# Patient Record
Sex: MALE | Race: NATIVE HAWAIIAN OR OTHER PACIFIC ISLANDER
[De-identification: names, ages, dates, MRNs, and addresses within clinical notes are randomized per-mention and may not be internally consistent; named-entity substitution may affect disease eponyms.]

---

## 2017-01-05 ENCOUNTER — HOSPITAL ENCOUNTER (OUTPATIENT)
Dept: HOSPITAL 67 - MRI | Age: 52
Discharge: HOME | End: 2017-01-05
Attending: NURSE PRACTITIONER
Payer: COMMERCIAL

## 2017-01-05 DIAGNOSIS — R10.32: Primary | ICD-10-CM

## 2017-01-27 ENCOUNTER — HOSPITAL ENCOUNTER (INPATIENT)
Dept: HOSPITAL 67 - ED | Age: 52
LOS: 4 days | Discharge: HOME | DRG: 155 | End: 2017-01-31
Attending: INTERNAL MEDICINE | Admitting: EMERGENCY MEDICINE
Payer: COMMERCIAL

## 2017-01-27 VITALS — DIASTOLIC BLOOD PRESSURE: 65 MMHG | SYSTOLIC BLOOD PRESSURE: 107 MMHG | TEMPERATURE: 98.2 F

## 2017-01-27 VITALS — SYSTOLIC BLOOD PRESSURE: 118 MMHG | DIASTOLIC BLOOD PRESSURE: 66 MMHG | TEMPERATURE: 97.8 F

## 2017-01-27 VITALS
HEIGHT: 76 IN | SYSTOLIC BLOOD PRESSURE: 147 MMHG | DIASTOLIC BLOOD PRESSURE: 82 MMHG | TEMPERATURE: 97.6 F | BODY MASS INDEX: 30.85 KG/M2 | WEIGHT: 253.37 LBS

## 2017-01-27 VITALS — DIASTOLIC BLOOD PRESSURE: 60 MMHG | SYSTOLIC BLOOD PRESSURE: 105 MMHG

## 2017-01-27 VITALS — DIASTOLIC BLOOD PRESSURE: 71 MMHG | TEMPERATURE: 98.2 F | SYSTOLIC BLOOD PRESSURE: 118 MMHG

## 2017-01-27 VITALS — DIASTOLIC BLOOD PRESSURE: 66 MMHG | SYSTOLIC BLOOD PRESSURE: 115 MMHG

## 2017-01-27 DIAGNOSIS — C91.10: ICD-10-CM

## 2017-01-27 DIAGNOSIS — J38.4: Primary | ICD-10-CM

## 2017-01-27 LAB
PLATELET # BLD: 189 K/UL (ref 142–355)
POTASSIUM SERPL-SCNC: 5.2 MMOL/L (ref 3.6–5.2)
SODIUM SERPL-SCNC: 135 MMOL/L (ref 136–145)

## 2017-01-27 PROCEDURE — 36415 COLL VENOUS BLD VENIPUNCTURE: CPT

## 2017-01-27 PROCEDURE — 96361 HYDRATE IV INFUSION ADD-ON: CPT

## 2017-01-27 PROCEDURE — 96367 TX/PROPH/DG ADDL SEQ IV INF: CPT

## 2017-01-27 PROCEDURE — 99284 EMERGENCY DEPT VISIT MOD MDM: CPT

## 2017-01-27 PROCEDURE — 80053 COMPREHEN METABOLIC PANEL: CPT

## 2017-01-27 PROCEDURE — 96365 THER/PROPH/DIAG IV INF INIT: CPT

## 2017-01-27 PROCEDURE — 85027 COMPLETE CBC AUTOMATED: CPT

## 2017-01-27 PROCEDURE — 96366 THER/PROPH/DIAG IV INF ADDON: CPT

## 2017-01-27 PROCEDURE — 85651 RBC SED RATE NONAUTOMATED: CPT

## 2017-01-27 PROCEDURE — 94760 N-INVAS EAR/PLS OXIMETRY 1: CPT

## 2017-01-27 PROCEDURE — 80048 BASIC METABOLIC PNL TOTAL CA: CPT

## 2017-01-27 PROCEDURE — 85007 BL SMEAR W/DIFF WBC COUNT: CPT

## 2017-01-27 PROCEDURE — 83735 ASSAY OF MAGNESIUM: CPT

## 2017-01-27 NOTE — NUR
PATIENT ADMITTED FROM ER TO ROOM 114. PATIENT IS ALERT AND ORIENTED ON
ARRIVAL, NO SIGNS OF DIFFICULTY BREATHING OR DISTRESS. ADMISSION ASSESSMENT
AND PHYSICAL OBTAINED FROM PATIENT. PATIENT ORIENTED TO BED AND CALL SYSTEM.
INSTRUCTED TO CALL FOR ASSISTANCE IF NEEDED. ADMISSION ORDERS NOTED AND
CARRIED OUT.

## 2017-01-27 NOTE — NUR
PATIENT HAS NO COMPLAINTS, DENIES PAIN. STATES HE IS SWALLOWING WITHOUT
DIFFICULTY. DISCONNECTED IV FOR HIM TO GET A SHOWER. FAMILY AT BEDSIDE.

## 2017-01-27 NOTE — NUR
1/27/17 4456 PT REQUESTING TO SEE IF HE COULD HAVE SOME OXYGEN TO USE.PT SAID
HE NOSE IS STOPPED UP AND CANNOT BREATHE OUT OF HIS NOSE.RESPIRATORY NOTIFIED
TO SET UP OXYGEN HUMFIED AT BEDSIDE.O2 PLACED ON PT PER RESPIRATORY AT 2
LPM.CC

## 2017-01-28 VITALS — SYSTOLIC BLOOD PRESSURE: 108 MMHG | TEMPERATURE: 98 F | DIASTOLIC BLOOD PRESSURE: 66 MMHG

## 2017-01-28 VITALS — SYSTOLIC BLOOD PRESSURE: 125 MMHG | DIASTOLIC BLOOD PRESSURE: 72 MMHG | TEMPERATURE: 98 F

## 2017-01-28 VITALS — DIASTOLIC BLOOD PRESSURE: 68 MMHG | SYSTOLIC BLOOD PRESSURE: 105 MMHG | TEMPERATURE: 97.6 F

## 2017-01-28 VITALS — DIASTOLIC BLOOD PRESSURE: 72 MMHG | TEMPERATURE: 98 F | SYSTOLIC BLOOD PRESSURE: 128 MMHG

## 2017-01-28 VITALS — TEMPERATURE: 98.1 F | DIASTOLIC BLOOD PRESSURE: 67 MMHG | SYSTOLIC BLOOD PRESSURE: 124 MMHG

## 2017-01-28 LAB
PLATELET # BLD: 207 K/UL (ref 142–355)
POTASSIUM SERPL-SCNC: 5.2 MMOL/L (ref 3.6–5.2)
SODIUM SERPL-SCNC: 132 MMOL/L (ref 136–145)

## 2017-01-28 NOTE — NUR
1/28/17 2000 A/O SITTING HIGH FOWLERS NO C/O VOICED.PT STATES HE IS NOT HAVING
ANY DIFFICULTY  SWALLOWING.PT SAID HE HAS HAD A GOOD DAY.PT SAID THAT THE
OXYGEN HELPS IF HE GETS STOPPED UP THAT IT SEEMS LIKE IT WORKS WELL.CC

## 2017-01-29 VITALS — TEMPERATURE: 98 F | DIASTOLIC BLOOD PRESSURE: 62 MMHG | SYSTOLIC BLOOD PRESSURE: 143 MMHG

## 2017-01-29 VITALS — SYSTOLIC BLOOD PRESSURE: 126 MMHG | TEMPERATURE: 98.1 F | DIASTOLIC BLOOD PRESSURE: 61 MMHG

## 2017-01-29 VITALS — DIASTOLIC BLOOD PRESSURE: 50 MMHG | TEMPERATURE: 97.9 F | SYSTOLIC BLOOD PRESSURE: 115 MMHG

## 2017-01-29 VITALS — DIASTOLIC BLOOD PRESSURE: 67 MMHG | SYSTOLIC BLOOD PRESSURE: 120 MMHG | TEMPERATURE: 98.2 F

## 2017-01-29 VITALS — DIASTOLIC BLOOD PRESSURE: 62 MMHG | TEMPERATURE: 98.1 F | SYSTOLIC BLOOD PRESSURE: 112 MMHG

## 2017-01-29 LAB
PLATELET # BLD: 210 K/UL (ref 142–355)
POTASSIUM SERPL-SCNC: 4.7 MMOL/L (ref 3.6–5.2)
SODIUM SERPL-SCNC: 137 MMOL/L (ref 136–145)

## 2017-01-30 VITALS — SYSTOLIC BLOOD PRESSURE: 122 MMHG | DIASTOLIC BLOOD PRESSURE: 67 MMHG | TEMPERATURE: 97.9 F

## 2017-01-30 VITALS — DIASTOLIC BLOOD PRESSURE: 67 MMHG | SYSTOLIC BLOOD PRESSURE: 111 MMHG | TEMPERATURE: 98.1 F

## 2017-01-30 LAB
PLATELET # BLD: 197 K/UL (ref 142–355)
POTASSIUM SERPL-SCNC: 4.5 MMOL/L (ref 3.6–5.2)
SODIUM SERPL-SCNC: 137 MMOL/L (ref 136–145)

## 2017-01-31 LAB
PLATELET # BLD: 242 K/UL (ref 142–355)
POTASSIUM SERPL-SCNC: 4.5 MMOL/L (ref 3.6–5.2)
SODIUM SERPL-SCNC: 138 MMOL/L (ref 136–145)

## 2017-05-25 ENCOUNTER — HOSPITAL ENCOUNTER (EMERGENCY)
Dept: HOSPITAL 67 - ED | Age: 52
LOS: 1 days | Discharge: HOME | End: 2017-05-26
Payer: COMMERCIAL

## 2017-05-25 VITALS — HEIGHT: 76 IN | BODY MASS INDEX: 29.22 KG/M2 | WEIGHT: 240 LBS

## 2017-05-25 DIAGNOSIS — R59.1: Primary | ICD-10-CM

## 2017-05-25 DIAGNOSIS — C85.90: ICD-10-CM

## 2017-05-25 DIAGNOSIS — R13.10: ICD-10-CM

## 2017-05-25 PROCEDURE — 85027 COMPLETE CBC AUTOMATED: CPT

## 2017-05-25 PROCEDURE — 99284 EMERGENCY DEPT VISIT MOD MDM: CPT

## 2017-05-25 PROCEDURE — 96361 HYDRATE IV INFUSION ADD-ON: CPT

## 2017-05-25 PROCEDURE — 85007 BL SMEAR W/DIFF WBC COUNT: CPT

## 2017-05-25 PROCEDURE — 36415 COLL VENOUS BLD VENIPUNCTURE: CPT

## 2017-05-25 PROCEDURE — 83880 ASSAY OF NATRIURETIC PEPTIDE: CPT

## 2017-05-25 PROCEDURE — 96374 THER/PROPH/DIAG INJ IV PUSH: CPT

## 2017-05-25 PROCEDURE — 80053 COMPREHEN METABOLIC PANEL: CPT

## 2017-05-26 VITALS — TEMPERATURE: 98.1 F | SYSTOLIC BLOOD PRESSURE: 140 MMHG | DIASTOLIC BLOOD PRESSURE: 78 MMHG

## 2017-05-26 LAB
PLATELET # BLD: 263 K/UL (ref 142–355)
POTASSIUM SERPL-SCNC: 4.3 MMOL/L (ref 3.6–5.2)
SODIUM SERPL-SCNC: 138 MMOL/L (ref 136–145)

## 2018-08-29 ENCOUNTER — HOSPITAL ENCOUNTER (INPATIENT)
Dept: HOSPITAL 67 - MED/SURG | Age: 53
LOS: 4 days | Discharge: ANOTHER HOSPITAL | DRG: 194 | End: 2018-09-02
Admitting: NURSE PRACTITIONER
Payer: COMMERCIAL

## 2018-08-29 VITALS
BODY MASS INDEX: 30.84 KG/M2 | WEIGHT: 253.25 LBS | WEIGHT: 253.25 LBS | HEIGHT: 76 IN | BODY MASS INDEX: 30.84 KG/M2 | HEIGHT: 76 IN

## 2018-08-29 VITALS — SYSTOLIC BLOOD PRESSURE: 124 MMHG | DIASTOLIC BLOOD PRESSURE: 60 MMHG | TEMPERATURE: 102 F

## 2018-08-29 VITALS — DIASTOLIC BLOOD PRESSURE: 83 MMHG | TEMPERATURE: 98.8 F | SYSTOLIC BLOOD PRESSURE: 126 MMHG

## 2018-08-29 DIAGNOSIS — C91.10: ICD-10-CM

## 2018-08-29 DIAGNOSIS — K21.9: ICD-10-CM

## 2018-08-29 DIAGNOSIS — J18.8: Primary | ICD-10-CM

## 2018-08-29 DIAGNOSIS — I10: ICD-10-CM

## 2018-08-29 LAB
PLATELET # BLD: 261 K/UL (ref 142–355)
POTASSIUM SERPL-SCNC: 4.2 MMOL/L (ref 3.6–5.2)
SODIUM SERPL-SCNC: 135 MMOL/L (ref 136–145)

## 2018-08-29 PROCEDURE — 94664 DEMO&/EVAL PT USE INHALER: CPT

## 2018-08-29 PROCEDURE — 80053 COMPREHEN METABOLIC PANEL: CPT

## 2018-08-29 PROCEDURE — 96372 THER/PROPH/DIAG INJ SC/IM: CPT

## 2018-08-29 PROCEDURE — 84484 ASSAY OF TROPONIN QUANT: CPT

## 2018-08-29 PROCEDURE — 82550 ASSAY OF CK (CPK): CPT

## 2018-08-29 PROCEDURE — 94760 N-INVAS EAR/PLS OXIMETRY 1: CPT

## 2018-08-29 PROCEDURE — 87070 CULTURE OTHR SPECIMN AEROBIC: CPT

## 2018-08-29 PROCEDURE — 83615 LACTATE (LD) (LDH) ENZYME: CPT

## 2018-08-29 PROCEDURE — 81000 URINALYSIS NONAUTO W/SCOPE: CPT

## 2018-08-29 PROCEDURE — 93005 ELECTROCARDIOGRAM TRACING: CPT

## 2018-08-29 PROCEDURE — 36600 WITHDRAWAL OF ARTERIAL BLOOD: CPT

## 2018-08-29 PROCEDURE — 87040 BLOOD CULTURE FOR BACTERIA: CPT

## 2018-08-29 PROCEDURE — 87186 SC STD MICRODIL/AGAR DIL: CPT

## 2018-08-29 PROCEDURE — 87205 SMEAR GRAM STAIN: CPT

## 2018-08-29 PROCEDURE — 94640 AIRWAY INHALATION TREATMENT: CPT

## 2018-08-29 PROCEDURE — 36415 COLL VENOUS BLD VENIPUNCTURE: CPT

## 2018-08-29 PROCEDURE — 83880 ASSAY OF NATRIURETIC PEPTIDE: CPT

## 2018-08-29 PROCEDURE — 83735 ASSAY OF MAGNESIUM: CPT

## 2018-08-29 PROCEDURE — 82553 CREATINE MB FRACTION: CPT

## 2018-08-29 PROCEDURE — 87077 CULTURE AEROBIC IDENTIFY: CPT

## 2018-08-29 PROCEDURE — 82805 BLOOD GASES W/O2 SATURATION: CPT

## 2018-08-29 PROCEDURE — 87804 INFLUENZA ASSAY W/OPTIC: CPT

## 2018-08-29 PROCEDURE — 85027 COMPLETE CBC AUTOMATED: CPT

## 2018-08-30 VITALS — SYSTOLIC BLOOD PRESSURE: 127 MMHG | TEMPERATURE: 98.5 F | DIASTOLIC BLOOD PRESSURE: 88 MMHG

## 2018-08-30 VITALS — DIASTOLIC BLOOD PRESSURE: 77 MMHG | TEMPERATURE: 98.7 F | SYSTOLIC BLOOD PRESSURE: 108 MMHG

## 2018-08-30 VITALS — TEMPERATURE: 98.8 F | SYSTOLIC BLOOD PRESSURE: 124 MMHG | DIASTOLIC BLOOD PRESSURE: 79 MMHG

## 2018-08-30 VITALS — SYSTOLIC BLOOD PRESSURE: 113 MMHG | TEMPERATURE: 98.1 F | DIASTOLIC BLOOD PRESSURE: 71 MMHG

## 2018-08-30 VITALS — SYSTOLIC BLOOD PRESSURE: 124 MMHG | TEMPERATURE: 99.3 F | DIASTOLIC BLOOD PRESSURE: 73 MMHG

## 2018-08-30 VITALS — DIASTOLIC BLOOD PRESSURE: 72 MMHG | SYSTOLIC BLOOD PRESSURE: 122 MMHG | TEMPERATURE: 102.4 F

## 2018-08-30 LAB
PLATELET # BLD: 221 K/UL (ref 142–355)
POTASSIUM SERPL-SCNC: 4.2 MMOL/L (ref 3.6–5.2)
SODIUM SERPL-SCNC: 139 MMOL/L (ref 136–145)

## 2018-08-31 VITALS — SYSTOLIC BLOOD PRESSURE: 121 MMHG | DIASTOLIC BLOOD PRESSURE: 74 MMHG | TEMPERATURE: 98.2 F

## 2018-08-31 VITALS — TEMPERATURE: 98.4 F | SYSTOLIC BLOOD PRESSURE: 118 MMHG | DIASTOLIC BLOOD PRESSURE: 73 MMHG

## 2018-08-31 VITALS — DIASTOLIC BLOOD PRESSURE: 75 MMHG | SYSTOLIC BLOOD PRESSURE: 120 MMHG | TEMPERATURE: 99.3 F

## 2018-08-31 VITALS — SYSTOLIC BLOOD PRESSURE: 120 MMHG | TEMPERATURE: 98.4 F | DIASTOLIC BLOOD PRESSURE: 77 MMHG

## 2018-08-31 VITALS — DIASTOLIC BLOOD PRESSURE: 76 MMHG | TEMPERATURE: 98.5 F | SYSTOLIC BLOOD PRESSURE: 123 MMHG

## 2018-08-31 VITALS — SYSTOLIC BLOOD PRESSURE: 105 MMHG | TEMPERATURE: 99.8 F | DIASTOLIC BLOOD PRESSURE: 62 MMHG

## 2018-08-31 LAB
PLATELET # BLD: 218 K/UL (ref 142–355)
POTASSIUM SERPL-SCNC: 4.3 MMOL/L (ref 3.6–5.2)
SODIUM SERPL-SCNC: 140 MMOL/L (ref 136–145)

## 2018-09-01 VITALS — DIASTOLIC BLOOD PRESSURE: 70 MMHG | SYSTOLIC BLOOD PRESSURE: 113 MMHG | TEMPERATURE: 98.2 F

## 2018-09-01 VITALS — DIASTOLIC BLOOD PRESSURE: 94 MMHG | SYSTOLIC BLOOD PRESSURE: 142 MMHG | TEMPERATURE: 99.3 F

## 2018-09-01 VITALS — TEMPERATURE: 98.1 F | SYSTOLIC BLOOD PRESSURE: 127 MMHG | DIASTOLIC BLOOD PRESSURE: 89 MMHG

## 2018-09-01 VITALS — SYSTOLIC BLOOD PRESSURE: 126 MMHG | DIASTOLIC BLOOD PRESSURE: 79 MMHG | TEMPERATURE: 97.8 F

## 2018-09-01 VITALS — DIASTOLIC BLOOD PRESSURE: 85 MMHG | SYSTOLIC BLOOD PRESSURE: 120 MMHG | TEMPERATURE: 97.7 F

## 2018-09-01 VITALS — DIASTOLIC BLOOD PRESSURE: 86 MMHG | SYSTOLIC BLOOD PRESSURE: 126 MMHG | TEMPERATURE: 98.9 F

## 2018-09-01 LAB
PLATELET # BLD: 189 K/UL (ref 142–355)
POTASSIUM SERPL-SCNC: 3.6 MMOL/L (ref 3.6–5.2)
SODIUM SERPL-SCNC: 140 MMOL/L (ref 136–145)

## 2018-09-02 ENCOUNTER — HOSPITAL ENCOUNTER (OUTPATIENT)
Dept: HOSPITAL 67 - AMB | Age: 53
Discharge: ANOTHER HOSPITAL | End: 2018-09-02
Payer: COMMERCIAL

## 2018-09-02 VITALS — DIASTOLIC BLOOD PRESSURE: 86 MMHG | SYSTOLIC BLOOD PRESSURE: 138 MMHG | TEMPERATURE: 98.3 F

## 2018-09-02 VITALS — DIASTOLIC BLOOD PRESSURE: 86 MMHG | SYSTOLIC BLOOD PRESSURE: 129 MMHG | TEMPERATURE: 98.3 F

## 2018-09-02 VITALS — SYSTOLIC BLOOD PRESSURE: 128 MMHG | TEMPERATURE: 98.6 F | DIASTOLIC BLOOD PRESSURE: 81 MMHG

## 2018-09-02 VITALS — SYSTOLIC BLOOD PRESSURE: 147 MMHG | DIASTOLIC BLOOD PRESSURE: 78 MMHG | TEMPERATURE: 98.1 F

## 2018-09-02 VITALS — SYSTOLIC BLOOD PRESSURE: 123 MMHG | TEMPERATURE: 98.3 F | DIASTOLIC BLOOD PRESSURE: 85 MMHG

## 2018-09-02 VITALS — TEMPERATURE: 98.3 F | DIASTOLIC BLOOD PRESSURE: 84 MMHG | SYSTOLIC BLOOD PRESSURE: 131 MMHG

## 2018-09-02 VITALS — SYSTOLIC BLOOD PRESSURE: 145 MMHG | DIASTOLIC BLOOD PRESSURE: 90 MMHG

## 2018-09-02 DIAGNOSIS — C91.10: ICD-10-CM

## 2018-09-02 DIAGNOSIS — I10: ICD-10-CM

## 2018-09-02 DIAGNOSIS — K21.9: ICD-10-CM

## 2018-09-02 DIAGNOSIS — J18.8: Primary | ICD-10-CM

## 2018-09-02 LAB
PLATELET # BLD: 202 K/UL (ref 142–355)
POTASSIUM SERPL-SCNC: 3.8 MMOL/L (ref 3.6–5.2)
SODIUM SERPL-SCNC: 138 MMOL/L (ref 136–145)

## 2018-09-02 PROCEDURE — A0425 GROUND MILEAGE: HCPCS

## 2018-09-02 PROCEDURE — A0427 ALS1-EMERGENCY: HCPCS

## 2021-08-25 ENCOUNTER — HOSPITAL ENCOUNTER (OUTPATIENT)
Dept: HOSPITAL 67 - RAD | Age: 56
Discharge: HOME | End: 2021-08-25
Attending: REGISTERED NURSE
Payer: COMMERCIAL

## 2021-08-25 DIAGNOSIS — R05: Primary | ICD-10-CM

## 2023-06-19 ENCOUNTER — HOSPITAL ENCOUNTER (OUTPATIENT)
Dept: HOSPITAL 67 - CT | Age: 58
Discharge: HOME | End: 2023-06-19
Attending: NURSE PRACTITIONER
Payer: COMMERCIAL

## 2023-06-19 ENCOUNTER — HOSPITAL ENCOUNTER (OUTPATIENT)
Dept: HOSPITAL 67 - RAD | Age: 58
Discharge: HOME | End: 2023-06-19
Attending: NURSE PRACTITIONER
Payer: COMMERCIAL

## 2023-06-19 DIAGNOSIS — I10: ICD-10-CM

## 2023-06-19 DIAGNOSIS — R42: ICD-10-CM

## 2023-06-19 DIAGNOSIS — R06.02: Primary | ICD-10-CM
